# Patient Record
Sex: FEMALE | Race: WHITE | NOT HISPANIC OR LATINO
[De-identification: names, ages, dates, MRNs, and addresses within clinical notes are randomized per-mention and may not be internally consistent; named-entity substitution may affect disease eponyms.]

---

## 2017-05-18 ENCOUNTER — RESULT REVIEW (OUTPATIENT)
Age: 27
End: 2017-05-18

## 2018-05-17 ENCOUNTER — RESULT REVIEW (OUTPATIENT)
Age: 28
End: 2018-05-17

## 2019-05-23 ENCOUNTER — RESULT REVIEW (OUTPATIENT)
Age: 29
End: 2019-05-23

## 2020-07-09 ENCOUNTER — RESULT REVIEW (OUTPATIENT)
Age: 30
End: 2020-07-09

## 2021-07-15 ENCOUNTER — RESULT REVIEW (OUTPATIENT)
Age: 31
End: 2021-07-15

## 2022-08-25 ENCOUNTER — RESULT REVIEW (OUTPATIENT)
Age: 32
End: 2022-08-25

## 2022-11-15 PROBLEM — Z00.00 ENCOUNTER FOR PREVENTIVE HEALTH EXAMINATION: Status: ACTIVE | Noted: 2022-11-15

## 2022-11-17 ENCOUNTER — APPOINTMENT (OUTPATIENT)
Dept: PODIATRY | Facility: CLINIC | Age: 32
End: 2022-11-17

## 2022-11-17 DIAGNOSIS — Z82.49 FAMILY HISTORY OF ISCHEMIC HEART DISEASE AND OTHER DISEASES OF THE CIRCULATORY SYSTEM: ICD-10-CM

## 2022-11-17 PROCEDURE — 99202 OFFICE O/P NEW SF 15 MIN: CPT

## 2022-11-17 PROCEDURE — 73630 X-RAY EXAM OF FOOT: CPT | Mod: LT

## 2022-11-17 RX ORDER — MELOXICAM 15 MG/1
15 TABLET ORAL DAILY
Qty: 14 | Refills: 0 | Status: ACTIVE | COMMUNITY
Start: 2022-11-17 | End: 1900-01-01

## 2022-11-22 NOTE — REASON FOR VISIT
[Initial Visit] : an initial visit for [Foot Pain] : foot pain [Other:___] : [unfilled] [FreeTextEntry2] : left

## 2022-11-22 NOTE — HISTORY OF PRESENT ILLNESS
[Sneakers] : lilian [FreeTextEntry1] : 32 year old female presents today with pain over the insertion of the peroneus brevis tendon, left foot.  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

## 2022-11-22 NOTE — PHYSICAL EXAM
[General Appearance - Alert] : alert [General Appearance - Well Nourished] : well nourished [General Appearance - Well-Appearing] : healthy appearing [2+] : left foot dorsalis pedis 2+ [Skin Color & Pigmentation] : normal skin color and pigmentation [Skin Turgor] : normal skin turgor [] : no rash [Skin Lesions] : no skin lesions [Sensation] : the sensory exam was normal to light touch and pinprick [No Focal Deficits] : no focal deficits [Deep Tendon Reflexes (DTR)] : deep tendon reflexes were 2+ and symmetric [Motor Exam] : the motor exam was normal [Oriented To Time, Place, And Person] : oriented to person, place, and time [Delayed in the Right Toes] : capillary refills normal in right toes [Delayed in the Left Toes] : capillary refills normal in the left toes [de-identified] : Pain at the tuberosity of the 5th met., left foot consistent with an enthesopathy of the peroneus brevis tendon.  Muscle strength is 5/5, bilateral and symmetrical.   [Foot Ulcer] : no foot ulcer [Skin Induration] : no skin induration

## 2022-11-22 NOTE — ASSESSMENT
[Verbal] : verbal [Patient] : patient [Pressure relief] : pressure relief [Off-loading] : off-loading [Pt responsibility to plan of care] : patient responsibility to plan of care [FreeTextEntry1] : Impression: Enthesopathy, left (M77.5) of the peroneus brevis.  Peroneal tendonitis, left (M76.7).  Difficulty walking (R26.2).\par \par Treatment: Patient was referred to the orthotist for a Cam boot.  She was given Mobic.\par Will reevaluate in 2 weeks.  Any questions or problems, patient is to contact the office.

## 2022-12-01 ENCOUNTER — APPOINTMENT (OUTPATIENT)
Dept: PODIATRY | Facility: CLINIC | Age: 32
End: 2022-12-01

## 2022-12-01 DIAGNOSIS — M77.9 ENTHESOPATHY, UNSPECIFIED: ICD-10-CM

## 2022-12-01 PROCEDURE — 99212 OFFICE O/P EST SF 10 MIN: CPT

## 2022-12-05 NOTE — ASSESSMENT
[FreeTextEntry1] : Impression: I feel that she continues to have peroneal tendonitis, insertionally of the left (M76.7).  Enthesopathy, left (M77.5) of the peroneus brevis. \par \par Treatment: Discussed the possibility of injection with mandatory use of the boot.  At this time, she wants to try the boot for 2 more weeks before she considers the injection.  She is to use an oral anti-inflammatory.  Any questions or problems, patient is to contact the office. \par

## 2022-12-05 NOTE — HISTORY OF PRESENT ILLNESS
[CAM Boot] : a CAM boot [FreeTextEntry1] : Patient presents today for reevaluation of enthesopathy of the peroneal tendon, left.  At this time, she was improved and then had a slight deterioration.  She has been using a Cam boot.  No other changes to her medical status.\par

## 2022-12-05 NOTE — PHYSICAL EXAM
[General Appearance - Alert] : alert [General Appearance - Well Nourished] : well nourished [General Appearance - Well-Appearing] : healthy appearing [2+] : left foot dorsalis pedis 2+ [Skin Color & Pigmentation] : normal skin color and pigmentation [Skin Turgor] : normal skin turgor [] : no rash [Skin Lesions] : no skin lesions [Sensation] : the sensory exam was normal to light touch and pinprick [No Focal Deficits] : no focal deficits [Deep Tendon Reflexes (DTR)] : deep tendon reflexes were 2+ and symmetric [Motor Exam] : the motor exam was normal [Delayed in the Right Toes] : capillary refills normal in right toes [Delayed in the Left Toes] : capillary refills normal in the left toes [de-identified] : Pain at the tuberosity of the 5th met., left foot consistent with an enthesopathy of the peroneus brevis tendon.  Muscle strength is 5/5, bilateral and symmetrical.   [Foot Ulcer] : no foot ulcer [Skin Induration] : no skin induration

## 2022-12-15 ENCOUNTER — APPOINTMENT (OUTPATIENT)
Dept: PODIATRY | Facility: CLINIC | Age: 32
End: 2022-12-15

## 2022-12-15 DIAGNOSIS — M76.72 PERONEAL TENDINITIS, LEFT LEG: ICD-10-CM

## 2022-12-15 PROCEDURE — 99212 OFFICE O/P EST SF 10 MIN: CPT

## 2022-12-16 PROBLEM — M76.72 PERONEAL TENDINITIS OF LEFT LOWER EXTREMITY: Status: ACTIVE | Noted: 2022-11-17

## 2022-12-19 NOTE — PHYSICAL EXAM
[General Appearance - Alert] : alert [General Appearance - Well-Appearing] : healthy appearing [2+] : left foot dorsalis pedis 2+ [Skin Color & Pigmentation] : normal skin color and pigmentation [Skin Turgor] : normal skin turgor [] : no rash [Skin Lesions] : no skin lesions [Sensation] : the sensory exam was normal to light touch and pinprick [No Focal Deficits] : no focal deficits [Deep Tendon Reflexes (DTR)] : deep tendon reflexes were 2+ and symmetric [Motor Exam] : the motor exam was normal [Oriented To Time, Place, And Person] : oriented to person, place, and time [Delayed in the Right Toes] : capillary refills normal in right toes [Delayed in the Left Toes] : capillary refills normal in the left toes [de-identified] : There is increased ROM.   [Foot Ulcer] : no foot ulcer [Skin Induration] : no skin induration

## 2022-12-19 NOTE — ASSESSMENT
[FreeTextEntry1] : Impression: Peroneal tendonitis, insertionally of the left (M76.7). \par \par Treatment: The patient is to continue using the Cam boot for 2 more weeks and then transition to a sneaker.  Will reevaluate at that time.  If she has no pain, she can cancel the appointment.  Any questions or problems, patient is to contact the office. \par

## 2022-12-19 NOTE — HISTORY OF PRESENT ILLNESS
[CAM Boot] : a CAM boot [FreeTextEntry1] : Patient presents today for reevaluation of peroneal tendonitis, left.  She is much improved with the use of the Cam boot.  Pain is decreased, swelling is decreased.  No new changes to her medical status.

## 2022-12-29 ENCOUNTER — APPOINTMENT (OUTPATIENT)
Dept: PODIATRY | Facility: CLINIC | Age: 32
End: 2022-12-29

## 2024-10-17 ENCOUNTER — RESULT REVIEW (OUTPATIENT)
Age: 34
End: 2024-10-17

## 2024-11-13 ENCOUNTER — NON-APPOINTMENT (OUTPATIENT)
Age: 34
End: 2024-11-13

## 2024-11-13 DIAGNOSIS — Z78.9 OTHER SPECIFIED HEALTH STATUS: ICD-10-CM

## 2024-11-13 DIAGNOSIS — Z98.890 OTHER SPECIFIED POSTPROCEDURAL STATES: ICD-10-CM

## 2024-11-13 DIAGNOSIS — R87.618 OTHER ABNORMAL CYTOLOGICAL FINDINGS ON SPECIMENS FROM CERVIX UTERI: ICD-10-CM

## 2024-11-13 RX ORDER — NORETHINDRONE ACETATE AND ETHINYL ESTRADIOL AND FERROUS FUMARATE 1MG-20(21)
KIT ORAL DAILY
Refills: 0 | Status: ACTIVE | COMMUNITY

## 2024-11-14 ENCOUNTER — APPOINTMENT (OUTPATIENT)
Facility: CLINIC | Age: 34
End: 2024-11-14

## 2024-11-14 ENCOUNTER — TRANSCRIPTION ENCOUNTER (OUTPATIENT)
Age: 34
End: 2024-11-14

## 2024-11-14 ENCOUNTER — LABORATORY RESULT (OUTPATIENT)
Age: 34
End: 2024-11-14

## 2024-11-14 VITALS — SYSTOLIC BLOOD PRESSURE: 110 MMHG | DIASTOLIC BLOOD PRESSURE: 80 MMHG

## 2024-11-14 DIAGNOSIS — B97.7 PAPILLOMAVIRUS AS THE CAUSE OF DISEASES CLASSIFIED ELSEWHERE: ICD-10-CM

## 2024-11-14 DIAGNOSIS — R87.810 ATYPICAL SQUAMOUS CELLS OF UNDETERMINED SIGNIFICANCE ON CYTOLOGIC SMEAR OF CERVIX (ASC-US): ICD-10-CM

## 2024-11-14 DIAGNOSIS — R87.610 ATYPICAL SQUAMOUS CELLS OF UNDETERMINED SIGNIFICANCE ON CYTOLOGIC SMEAR OF CERVIX (ASC-US): ICD-10-CM

## 2024-11-14 LAB — HCG UR QL: NEGATIVE

## 2024-11-14 PROCEDURE — 81025 URINE PREGNANCY TEST: CPT

## 2024-11-14 PROCEDURE — 57454 BX/CURETT OF CERVIX W/SCOPE: CPT

## 2024-11-25 ENCOUNTER — NON-APPOINTMENT (OUTPATIENT)
Age: 34
End: 2024-11-25

## 2025-05-01 ENCOUNTER — APPOINTMENT (OUTPATIENT)
Facility: CLINIC | Age: 35
End: 2025-05-01
Payer: COMMERCIAL

## 2025-05-01 VITALS — SYSTOLIC BLOOD PRESSURE: 119 MMHG | DIASTOLIC BLOOD PRESSURE: 76 MMHG

## 2025-05-01 DIAGNOSIS — Z30.41 ENCOUNTER FOR SURVEILLANCE OF CONTRACEPTIVE PILLS: ICD-10-CM

## 2025-05-01 LAB — HCG UR QL: NEGATIVE

## 2025-05-01 PROCEDURE — 99213 OFFICE O/P EST LOW 20 MIN: CPT

## 2025-05-01 PROCEDURE — 99459 PELVIC EXAMINATION: CPT

## 2025-05-01 PROCEDURE — 81025 URINE PREGNANCY TEST: CPT

## 2025-05-01 RX ORDER — NORETHINDRONE ACETATE AND ETHINYL ESTRADIOL AND FERROUS FUMARATE 1MG-20(21)
1-20 KIT ORAL
Qty: 84 | Refills: 1 | Status: ACTIVE | COMMUNITY
Start: 2025-04-25 | End: 1900-01-01